# Patient Record
(demographics unavailable — no encounter records)

---

## 2024-11-11 NOTE — PLAN
[FreeTextEntry1] : HCM -Reviewed recent bloodwork with patient, overall WNL, f/u HgbA1c -Up to date on mammogram, pap smear, continue regular follow up with gyn -Patient states she already received flu vaccine this year

## 2024-11-11 NOTE — HISTORY OF PRESENT ILLNESS
[FreeTextEntry1] : Establish care, CPE [de-identified] : 40 yo female presents to establish care, CPE.  Patient regularly sees gynecologist Dr. Jillian Duffy - last pap smear Sept 2024, WNL. Patient has hx breast fibroadenoma, s/p multiple biopsies/Hx PASH, Papilloma. Last mammo was April 2024, does surveillance imaging. Patient states at her last few appts her BP has been elevated. States at doctors offices BP ranging SBP 150s/160s, DBP 90s. Patient has started checking BP at home regularly, reports SBP 130s, DBP tends to be high 80s or 90s. Denies any CP, HA, changes in vision. Patient has been on same OCPs for last 10 years. Overall feeling well today.

## 2024-11-11 NOTE — HEALTH RISK ASSESSMENT
[Yes] : Yes [2 - 4 times a month (2 pts)] : 2-4 times a month (2 points) [1 or 2 (0 pts)] : 1 or 2 (0 points) [Never (0 pts)] : Never (0 points) [No] : In the past 12 months have you used drugs other than those required for medical reasons? No [No falls in past year] : Patient reported no falls in the past year [0] : 2) Feeling down, depressed, or hopeless: Not at all (0) [PHQ-2 Negative - No further assessment needed] : PHQ-2 Negative - No further assessment needed [None] : None [Alone] : lives alone [Employed] : employed [Fully functional (bathing, dressing, toileting, transferring, walking, feeding)] : Fully functional (bathing, dressing, toileting, transferring, walking, feeding) [Fully functional (using the telephone, shopping, preparing meals, housekeeping, doing laundry, using] : Fully functional and needs no help or supervision to perform IADLs (using the telephone, shopping, preparing meals, housekeeping, doing laundry, using transportation, managing medications and managing finances) [Never] : Never [Patient reported mammogram was normal] : Patient reported mammogram was normal [Patient reported PAP Smear was normal] : Patient reported PAP Smear was normal [HIV test declined] : HIV test declined [Hepatitis C test declined] : Hepatitis C test declined [Audit-CScore] : 2 [de-identified] : yoga 4x week, cardio [de-identified] : balanced [[id number]] : 0 [Change in mental status noted] : No change in mental status noted [MammogramDate] : 04/24 [PapSmearDate] : 09/24 [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

## 2025-04-15 NOTE — ASSESSMENT
[FreeTextEntry1] : #Skin cancer screening  Sun protection reviewed. The patient was educated regarding appropriate sun protection measures, including wearing sunscreen with SPF 30 or higher when outdoors, sun protective clothing, and sun avoidance.   #Benign appearing nevi Patient was reassured of the benign nature of these findings. No further treatment needed at this time. If any lesion changes or becomes symptomatic I recommend follow-up  #Irritated nevus- RL abdomen Clinical features and dermoscopic pattern benign. for itching: start clobetasol .05% oint bid for two weeks on and two weeks off as needed for itching. if symptoms do not improve, or if lesion continues to change in appearance, recommend follow up in clinic for removal.   # inflamed cyst- R axilla EIC vs. furuncle vs. HS -ILK today Intralesional Kenalog- Procedure Note Verbal consent was obtained from the patient. The risks of bleeding, infection, atrophy, and hypopigmentation were reviewed with the patient  1  lesion located on the R axilla was injected with 10 mg/cc of Kenalog, for a total volume of 0.6 cc. -start clindamycin lotion bid to aa prn  #Acne vulgaris, hormonal -start tret .025% 2-3x weekly, SED - Start spironolactone 50 mg QD with instruction to increase to 100 mg QD after 2 weeks if tolerated. Discussed side effects of GI upset, headache, menstrual irregularities, breast tenderness, rare hyperkalemia. Instructed to d/c if pregnant  #  Hypertrophic scars- chest Counseling about condition provided. Will proceed with treatment given symptoms of pain/sensitivity - Injected with Kenalog 10 mg/mL for a total of  1.0 mL , 8 injection sites - Follow up in 4-6 weeks for retreatment as desired  #Neoplasm of Uncertain Behavior Location: L paraspinal midback Differential Diagnosis: nevus, DN, melanoma Recommendation: skin biopsy by shave technique   Clinical photographs were recommended in order to document the appearance and location of skin lesion/s.  Verbal consent obtained from the patient to proceed with photography. These photos will remain in the patient's electronic health record.   Biopsy by Shave Technique- Procedure Note Verbal consent was obtained and a time out was performed. The patient was counseled about the risk of bleeding, scar, and infection. The area was cleaned with an alcohol swab. Anesthesia: 1% lidocaine with 1:100,000 epinephrine buffered with sodium bicarbonate Procedure: Biopsy by shave technique The specimen was sent for pathologic examination. Hemostasis: aluminum chloride A bandage was placed and wound care was reviewed with the patient.  RTC 3 months or sooner prn

## 2025-04-15 NOTE — PHYSICAL EXAM
[FreeTextEntry3] : Exam of external genitalia was deferred.  -On the trunk and upper/lower extremities bilaterally, are multiple scattered tan to brown macules and papules with regular borders. Some of these were examined dermoscopically and had reassuring features. -L chest, R chest: raised linear smooth cicatricial plaques  -jawline , chin: scattered acneiform papules, comedones.  -R axilla: erythematous cystic nodule - RLAbdomen: smooth light brown papule, reticular symmetrical pigment network, some erythema at base.  -L paraspinal midback: back papule, asymmetrical pigment. appx 3-4mm

## 2025-04-15 NOTE — HISTORY OF PRESENT ILLNESS
[FreeTextEntry1] : NPA - FBSE [de-identified] : Xiomy Braun 40 y/o F presents for FBSE. - Itchy mole on right abdomen  - Cyst in axilla years ago , now new one on R axilla.  - Acne on lower face.  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 Scars are raised. based on review of her prior notes, she had scars treated w/ ILK in 2017 with reported improvement.    Personal history of skin cancer: No  Family history of skin cancer: No  History of blistering sunburns: No  History of tanning bed use: No  Uses sunscreen regularly: Yes

## 2025-04-15 NOTE — HISTORY OF PRESENT ILLNESS
[FreeTextEntry1] : NPA - FBSE [de-identified] : Xiomy Braun 42 y/o F presents for FBSE. - Itchy mole on right abdomen  - Cyst in axilla years ago , now new one on R axilla.  - Acne on lower face.  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 Scars are raised. based on review of her prior notes, she had scars treated w/ ILK in 2017 with reported improvement.    Personal history of skin cancer: No  Family history of skin cancer: No  History of blistering sunburns: No  History of tanning bed use: No  Uses sunscreen regularly: Yes

## 2025-04-15 NOTE — HISTORY OF PRESENT ILLNESS
[FreeTextEntry1] : NPA - FBSE [de-identified] : Xiomy Braun 40 y/o F presents for FBSE. - Itchy mole on right abdomen  - Cyst in axilla years ago , now new one on R axilla.  - Acne on lower face.  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 Scars are raised. based on review of her prior notes, she had scars treated w/ ILK in 2017 with reported improvement.    Personal history of skin cancer: No  Family history of skin cancer: No  History of blistering sunburns: No  History of tanning bed use: No  Uses sunscreen regularly: Yes

## 2025-05-23 NOTE — HISTORY OF PRESENT ILLNESS
[FreeTextEntry1] : RPA - Follow up ILK and spot on R toe [de-identified] : Xiomy Braun 42 y/o F presents for F/U kenalog and spot on R toe.  Lv 05/01/25.   hypertrophic scar much flatter, lighter  abscess resolved  new bump on R 5th toe, noticed recently __________ LV 04/09/25 Xiomy Braun 42 y/o F presents for FBSE. - Itchy mole on right abdomen  - Cyst in axilla years ago , now new one on R axilla.  - Acne on lower face.  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 Scars are raised. based on review of her prior notes, she had scars treated w/ ILK in 2017 with reported improvement.    Personal history of skin cancer: No  Family history of skin cancer: No  History of blistering sunburns: No  History of tanning bed use: No  Uses sunscreen regularly: Yes

## 2025-05-23 NOTE — ASSESSMENT
[FreeTextEntry1] :  #  Hypertrophic scars- chest Counseling about condition provided. Will proceed with treatment given symptoms of pain/sensitivity - s/p ILK #1 on 4/9/25 - Follow up every 4-6 weeks for retreatment as desired  Intralesional Kenalog- Procedure Note Verbal consent was obtained from the patient. The risks of bleeding, infection, atrophy, and hypopigmentation were reviewed with the patient. 1 lesion located on the NESTOR was injected with 5 mg/cc of Kenalog, for a total volume of 0.8 cc.  #Verruca Vulgaris Provided anticipatory guidance and education regarding these lesions.  Treatment options discussed.  The patient understands that there is no great single treatment for warts, but destructive methods are often employed to cause an irritant reaction and recognition of the virus by the body's immune system.  Recommend treatment with *** at this time.  The use of OTC salicylic acid plasters daily was also recommended.   Cryotherapy Procedure Note.  Lesion(s) treated: 1, Location: R 5th medial toe Potential risk of procedure including pain, redness, swelling, scarring, blistering, and dyspigmentation were reviewed. Verbal consent obtained and liquid nitrogen was applied to the above lesions x 3 cycles.  The patient tolerated this procedure well.  Wound care was reviewed.  Additional treatments may be necessary and patient advised to return if additional cryotherapy is needed.   #HS, felder 1 - inactive Given recurrent nature of process on axilla, favor HS.  Other pertinent hx: pilonidal cyst as child, history of recurrent axillary boils intermittently Plan: -clindamycin lotion bid to AA -recommend starting oscar as below (for acne). she wants to wait until next PCP appt to discuss first since she takes other anti htn rx.  -follow up in 3 months or sooner prn  __ addressed prior visit  #Acne vulgaris, hormonal -c/w tret .025% 2-3x weekly, SED - Start spironolactone 50 mg QD with instruction to increase to 100 mg QD after 2 weeks if tolerated. Discussed side effects of GI upset, headache, menstrual irregularities, breast tenderness, rare hyperkalemia. Instructed to d/c if pregnant  RTC 3 mo or sooner rpn

## 2025-05-23 NOTE — PHYSICAL EXAM
[FreeTextEntry3] : chest: mostly flat hyperpigmented cicatrix with some areas of slight induration   R medial 5th toe: verrucous papule